# Patient Record
Sex: MALE | URBAN - METROPOLITAN AREA
[De-identification: names, ages, dates, MRNs, and addresses within clinical notes are randomized per-mention and may not be internally consistent; named-entity substitution may affect disease eponyms.]

---

## 2019-03-07 ENCOUNTER — OFFICE VISIT (OUTPATIENT)
Dept: URBAN - METROPOLITAN AREA CLINIC 33 | Facility: CLINIC | Age: 34
End: 2019-03-07
Payer: COMMERCIAL

## 2019-03-07 DIAGNOSIS — H04.123 DRY EYE SYNDROME OF BILATERAL LACRIMAL GLANDS: ICD-10-CM

## 2019-03-07 DIAGNOSIS — E11.9 TYPE 2 DIABETES MELLITUS W/O COMPLICATION: Primary | ICD-10-CM

## 2019-03-07 PROCEDURE — 99204 OFFICE O/P NEW MOD 45 MIN: CPT | Performed by: OPTOMETRIST

## 2019-03-07 ASSESSMENT — INTRAOCULAR PRESSURE
OD: 20
OS: 21

## 2019-03-07 ASSESSMENT — KERATOMETRY
OD: 45.88
OS: 47.00

## 2019-03-07 NOTE — IMPRESSION/PLAN
Impression: Type 2 diabetes mellitus w/o complication: K83.6. Plan: Diabetes w/o Complications: No signs of diabetic retinopathy noted. No treatment necessary at this time. Patient was instructed to monitor vision for sudden changes and to call if visual changes noted. Discussed ocular and systemic benefits of blood sugar control. Continue management with PCP. Letter sent to PCP regarding status of ocular health. If desired, return for glasses prescription.

## 2025-03-08 ENCOUNTER — HOSPITAL ENCOUNTER (EMERGENCY)
Facility: HOSPITAL | Age: 40
Discharge: HOME | End: 2025-03-08
Attending: EMERGENCY MEDICINE

## 2025-03-08 VITALS
SYSTOLIC BLOOD PRESSURE: 160 MMHG | OXYGEN SATURATION: 97 % | HEART RATE: 68 BPM | DIASTOLIC BLOOD PRESSURE: 74 MMHG | RESPIRATION RATE: 10 BRPM

## 2025-03-08 DIAGNOSIS — Z76.0 ENCOUNTER FOR MEDICATION REFILL: ICD-10-CM

## 2025-03-08 DIAGNOSIS — R20.2 PARESTHESIAS: Primary | ICD-10-CM

## 2025-03-08 DIAGNOSIS — R03.0 ELEVATED BLOOD PRESSURE READING: ICD-10-CM

## 2025-03-08 DIAGNOSIS — R29.818 ACUTE FOCAL NEUROLOGICAL DEFICIT: ICD-10-CM

## 2025-03-08 LAB
ALBUMIN SERPL BCP-MCNC: 4.1 G/DL (ref 3.5–5.2)
ALP SERPL-CCNC: 76 U/L (ref 40–150)
ALT SERPL W/O P-5'-P-CCNC: 16 U/L (ref 10–44)
ANION GAP SERPL CALC-SCNC: 10 MMOL/L (ref 8–16)
AST SERPL-CCNC: 19 U/L (ref 10–40)
BASOPHILS # BLD AUTO: 0.08 K/UL (ref 0–0.2)
BASOPHILS NFR BLD: 0.7 % (ref 0–1.9)
BILIRUB SERPL-MCNC: 0.6 MG/DL (ref 0.1–1)
BNP SERPL-MCNC: 130 PG/ML (ref 0–99)
BUN SERPL-MCNC: 18 MG/DL (ref 6–20)
CALCIUM SERPL-MCNC: 10 MG/DL (ref 8.7–10.5)
CHLORIDE SERPL-SCNC: 104 MMOL/L (ref 95–110)
CHOLEST SERPL-MCNC: 168 MG/DL (ref 120–199)
CHOLEST/HDLC SERPL: 3.9 {RATIO} (ref 2–5)
CO2 SERPL-SCNC: 21 MMOL/L (ref 23–29)
CREAT SERPL-MCNC: 1.2 MG/DL (ref 0.5–1.4)
CREAT SERPL-MCNC: 1.3 MG/DL (ref 0.5–1.4)
DIFFERENTIAL METHOD BLD: ABNORMAL
EOSINOPHIL # BLD AUTO: 1 K/UL (ref 0–0.5)
EOSINOPHIL NFR BLD: 8.7 % (ref 0–8)
ERYTHROCYTE [DISTWIDTH] IN BLOOD BY AUTOMATED COUNT: 12.1 % (ref 11.5–14.5)
EST. GFR  (NO RACE VARIABLE): >60 ML/MIN/1.73 M^2
GLUCOSE SERPL-MCNC: 124 MG/DL (ref 70–110)
HCT VFR BLD AUTO: 47.2 % (ref 40–54)
HDLC SERPL-MCNC: 43 MG/DL (ref 40–75)
HDLC SERPL: 25.6 % (ref 20–50)
HGB BLD-MCNC: 16.1 G/DL (ref 14–18)
IMM GRANULOCYTES # BLD AUTO: 0.05 K/UL (ref 0–0.04)
IMM GRANULOCYTES NFR BLD AUTO: 0.5 % (ref 0–0.5)
INR PPP: 0.9 (ref 0.8–1.2)
LDLC SERPL CALC-MCNC: 109.8 MG/DL (ref 63–159)
LYMPHOCYTES # BLD AUTO: 2.9 K/UL (ref 1–4.8)
LYMPHOCYTES NFR BLD: 25.8 % (ref 18–48)
MCH RBC QN AUTO: 29.6 PG (ref 27–31)
MCHC RBC AUTO-ENTMCNC: 34.1 G/DL (ref 32–36)
MCV RBC AUTO: 87 FL (ref 82–98)
MONOCYTES # BLD AUTO: 0.7 K/UL (ref 0.3–1)
MONOCYTES NFR BLD: 5.9 % (ref 4–15)
NEUTROPHILS # BLD AUTO: 6.5 K/UL (ref 1.8–7.7)
NEUTROPHILS NFR BLD: 58.4 % (ref 38–73)
NONHDLC SERPL-MCNC: 125 MG/DL
NRBC BLD-RTO: 0 /100 WBC
PLATELET # BLD AUTO: 256 K/UL (ref 150–450)
PLATELET BLD QL SMEAR: ABNORMAL
PMV BLD AUTO: 10.7 FL (ref 9.2–12.9)
POCT GLUCOSE: 121 MG/DL (ref 70–110)
POTASSIUM SERPL-SCNC: 4.6 MMOL/L (ref 3.5–5.1)
PROT SERPL-MCNC: 8 G/DL (ref 6–8.4)
PROTHROMBIN TIME: 10.5 SEC (ref 9–12.5)
RBC # BLD AUTO: 5.44 M/UL (ref 4.6–6.2)
SAMPLE: NORMAL
SODIUM SERPL-SCNC: 135 MMOL/L (ref 136–145)
TRIGL SERPL-MCNC: 76 MG/DL (ref 30–150)
TROPONIN I SERPL DL<=0.01 NG/ML-MCNC: 0.01 NG/ML (ref 0–0.03)
TSH SERPL DL<=0.005 MIU/L-ACNC: 0.72 UIU/ML (ref 0.4–4)
WBC # BLD AUTO: 11.08 K/UL (ref 3.9–12.7)

## 2025-03-08 PROCEDURE — 84443 ASSAY THYROID STIM HORMONE: CPT | Performed by: EMERGENCY MEDICINE

## 2025-03-08 PROCEDURE — G0426 INPT/ED TELECONSULT50: HCPCS | Mod: GT,,, | Performed by: STUDENT IN AN ORGANIZED HEALTH CARE EDUCATION/TRAINING PROGRAM

## 2025-03-08 PROCEDURE — 80053 COMPREHEN METABOLIC PANEL: CPT | Performed by: EMERGENCY MEDICINE

## 2025-03-08 PROCEDURE — 85610 PROTHROMBIN TIME: CPT

## 2025-03-08 PROCEDURE — 85610 PROTHROMBIN TIME: CPT | Performed by: EMERGENCY MEDICINE

## 2025-03-08 PROCEDURE — 85025 COMPLETE CBC W/AUTO DIFF WBC: CPT | Performed by: EMERGENCY MEDICINE

## 2025-03-08 PROCEDURE — 25500020 PHARM REV CODE 255: Performed by: EMERGENCY MEDICINE

## 2025-03-08 PROCEDURE — 99900035 HC TECH TIME PER 15 MIN (STAT)

## 2025-03-08 PROCEDURE — 82803 BLOOD GASES ANY COMBINATION: CPT

## 2025-03-08 PROCEDURE — 80061 LIPID PANEL: CPT | Performed by: EMERGENCY MEDICINE

## 2025-03-08 PROCEDURE — 83880 ASSAY OF NATRIURETIC PEPTIDE: CPT | Performed by: EMERGENCY MEDICINE

## 2025-03-08 PROCEDURE — 93005 ELECTROCARDIOGRAM TRACING: CPT

## 2025-03-08 PROCEDURE — 84484 ASSAY OF TROPONIN QUANT: CPT | Performed by: EMERGENCY MEDICINE

## 2025-03-08 PROCEDURE — 82565 ASSAY OF CREATININE: CPT

## 2025-03-08 PROCEDURE — 93010 ELECTROCARDIOGRAM REPORT: CPT | Mod: ,,, | Performed by: STUDENT IN AN ORGANIZED HEALTH CARE EDUCATION/TRAINING PROGRAM

## 2025-03-08 PROCEDURE — 82962 GLUCOSE BLOOD TEST: CPT

## 2025-03-08 PROCEDURE — 99285 EMERGENCY DEPT VISIT HI MDM: CPT | Mod: 25

## 2025-03-08 RX ORDER — HYDRALAZINE HYDROCHLORIDE 25 MG/1
25 TABLET, FILM COATED ORAL EVERY 8 HOURS
COMMUNITY

## 2025-03-08 RX ORDER — DILTIAZEM HYDROCHLORIDE 120 MG/1
120 CAPSULE, EXTENDED RELEASE ORAL DAILY
COMMUNITY
End: 2025-03-08

## 2025-03-08 RX ORDER — IRBESARTAN 150 MG/1
150 TABLET ORAL DAILY
Qty: 90 TABLET | Refills: 0 | Status: SHIPPED | OUTPATIENT
Start: 2025-03-08

## 2025-03-08 RX ORDER — IRBESARTAN 150 MG/1
150 TABLET ORAL DAILY
COMMUNITY
End: 2025-03-08

## 2025-03-08 RX ORDER — CARVEDILOL 12.5 MG/1
12.5 TABLET ORAL 2 TIMES DAILY WITH MEALS
COMMUNITY

## 2025-03-08 RX ORDER — GABAPENTIN 100 MG/1
100 CAPSULE ORAL 3 TIMES DAILY PRN
Qty: 30 CAPSULE | Refills: 0 | Status: SHIPPED | OUTPATIENT
Start: 2025-03-08 | End: 2026-03-08

## 2025-03-08 RX ORDER — DILTIAZEM HYDROCHLORIDE 120 MG/1
120 CAPSULE, EXTENDED RELEASE ORAL DAILY
Qty: 30 CAPSULE | Refills: 0 | Status: SHIPPED | OUTPATIENT
Start: 2025-03-08

## 2025-03-08 RX ADMIN — IOHEXOL 100 ML: 350 INJECTION, SOLUTION INTRAVENOUS at 04:03

## 2025-03-08 NOTE — ED PROVIDER NOTES
Encounter Date: 3/8/2025       History     Chief Complaint   Patient presents with    Numbness     LUE numbness started yesterday at 1700. LLE numbness x3 days. /109. -facial droop, -weakness, -slurred speech, -VAN.      Pt is a 40 yo M with a pmhx of HNT who presents to the ED with the complaint of LUE and LLE paresthesias that began approximately 23 hours ago. 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 He also reports his blood pressure being elevated 2/2 to being out of his ARB. He denies any facial droop, aphasia, slurred speech, overt numbness and/or weakness of any extremity.     Per triage note, a stroke code was initiate (prior to my evaluation) 2/2 to EPIC check box triggering.       Review of patient's allergies indicates:  No Known Allergies  History reviewed. No pertinent past medical history.  History reviewed. No pertinent surgical history.  No family history on file.  Social History[1]  Review of Systems   Constitutional:  Negative for chills, fatigue and fever.   Respiratory:  Negative for cough and chest tightness.    Cardiovascular:  Negative for chest pain and leg swelling.   Gastrointestinal:  Negative for nausea and vomiting.   Musculoskeletal:  Negative for back pain, neck pain and neck stiffness.   Neurological:  Negative for dizziness, tremors, seizures, syncope, weakness, numbness and headaches.   Psychiatric/Behavioral:  Negative for agitation and confusion.        Physical Exam     Initial Vitals [03/08/25 1551]   BP Pulse Resp Temp SpO2   (!) 222/109 98 16 -- 98 %      MAP       --         Physical Exam    Nursing note and vitals reviewed.  Constitutional: He appears well-developed and well-nourished.   HENT:   Head: Normocephalic and atraumatic.   Right Ear: External ear normal.   Left Ear: External ear normal.   Eyes: EOM are normal. Pupils are equal, round, and reactive  to light.   Neck: Neck supple.   Normal range of motion.  Cardiovascular:  Normal rate and regular rhythm.           Pulmonary/Chest: Breath sounds normal.   Abdominal: Abdomen is soft. Bowel sounds are normal.   Musculoskeletal:      Cervical back: Normal range of motion and neck supple.     Neurological: He is alert and oriented to person, place, and time. He has normal strength.   Strength 5/5 in all four extremities  Sensation grossly in tact  MAEW  GCS 15  AAOX3  Gait Wnl   No aphasia  No facial droop  No dysarthria    Skin: Skin is warm. Capillary refill takes less than 2 seconds.   Psychiatric: He has a normal mood and affect.         ED Course   Procedures  Labs Reviewed   CBC W/ AUTO DIFFERENTIAL - Abnormal       Result Value    WBC 11.08      RBC 5.44      Hemoglobin 16.1      Hematocrit 47.2      MCV 87      MCH 29.6      MCHC 34.1      RDW 12.1      Platelets 256      MPV 10.7      Immature Granulocytes 0.5      Gran # (ANC) 6.5      Immature Grans (Abs) 0.05 (*)     Lymph # 2.9      Mono # 0.7      Eos # 1.0 (*)     Baso # 0.08      nRBC 0      Gran % 58.4      Lymph % 25.8      Mono % 5.9      Eosinophil % 8.7 (*)     Basophil % 0.7      Platelet Estimate Appears normal      Differential Method Automated     COMPREHENSIVE METABOLIC PANEL - Abnormal    Sodium 135 (*)     Potassium 4.6      Chloride 104      CO2 21 (*)     Glucose 124 (*)     BUN 18      Creatinine 1.2      Calcium 10.0      Total Protein 8.0      Albumin 4.1      Total Bilirubin 0.6      Alkaline Phosphatase 76      AST 19      ALT 16      eGFR >60      Anion Gap 10     B-TYPE NATRIURETIC PEPTIDE - Abnormal     (*)    POCT GLUCOSE - Abnormal    POCT Glucose 121 (*)    PROTIME-INR    Prothrombin Time 10.5      INR 0.9     TSH    TSH 0.716     LIPID PANEL    Cholesterol 168      Triglycerides 76      HDL 43      LDL Cholesterol 109.8      HDL/Cholesterol Ratio 25.6      Total Cholesterol/HDL Ratio 3.9      Non-HDL Cholesterol 125      B-TYPE NATRIURETIC PEPTIDE   TROPONIN I   TROPONIN I    Troponin I 0.011     POCT GLUCOSE, HAND-HELD DEVICE   ISTAT CREATININE    POC Creatinine 1.3      Sample VENOUS       EKG Readings: (Independently Interpreted)   SR; RBBB; no significant        Imaging Results              CTA Head and Neck (xpd) (Final result)  Result time 03/08/25 17:04:05      Final result by Jerry Centeno MD (03/08/25 17:04:05)                   Impression:      No acute intracranial process.    Partially empty sella, transverse sinus stenosis and question fullness of the optic nerve sheaths can be seen within normal variation however they can also be seen with idiopathic intracranial hypertension if suspected clinically.    Prominent sinus and nasal cavity fluid/mucosal thickening.    No significant stenosis at the carotid bifurcations by NASCET criteria.  The vertebral arteries are patent without advanced stenosis.    No major branch advanced stenosis/occlusion at the [id number]-Yfgtbf.      Electronically signed by: Jerry Centeno  Date:    03/08/2025  Time:    17:04               Narrative:    EXAMINATION:  CTA HEAD AND NECK (XPD)    CLINICAL HISTORY:  Neuro deficit, acute, stroke suspected;    TECHNIQUE:  Non contrast low dose axial images were obtained through the head.  CT angiogram was performed from the level of the christy to the top of the head following the IV administration of 100mL of Omnipaque 350.   Sagittal and coronal reconstructions and maximum intensity projection reconstructions were performed. Arterial stenosis percentages are based on NASCET measurement criteria.    3D reformats were created on an independent workstation to evaluate the ufvcln-jv-Oilomf.    COMPARISON:  None    FINDINGS:  Brain:    There is no evidence of hydrocephalus, mass effect, intracranial hemorrhage, or acute territorial infarct.    The brain parenchyma maintains normal attenuation.    Partially empty sella.  Question fullness of the  optic nerve sheaths.    No enhancing intracranial lesion.    Opacification of the maxillary sinuses and mid to inferior nasal cavity.    CTA:    No advanced stenosis at the origins of the vessels from the aortic arch.    No advanced stenosis of the vertebral arteries in the neck.    No significant stenosis at the carotid bifurcations by NASCET criteria.    Intracranially there is no major branch advanced stenosis/occlusion.    No aneurysm. The venous sinuses are patent although there appears to be transverse sinus stenosis bilaterally.    No soft tissue mass in the neck.    These findings were communicated to Dr. Pina at 4 50 20:00 on 03/08/2025.                                       Medications   iohexoL (OMNIPAQUE 350) injection 100 mL (100 mLs Intravenous Given 3/8/25 1607)     Medical Decision Making  Amount and/or Complexity of Data Reviewed  Labs: ordered. Decision-making details documented in ED Course.  Radiology: ordered.    Risk  Prescription drug management.               ED Course as of 03/08/25 1912   Sat Mar 08, 2025   1708 BNP(!): 130 [LC]   1709 No acute intracranial process.     Partially empty sella, transverse sinus stenosis and question fullness of the optic nerve sheaths can be seen within normal variation however they can also be seen with idiopathic intracranial hypertension if suspected clinically.     Prominent sinus and nasal cavity fluid/mucosal thickening.     No significant stenosis at the carotid bifurcations by NASCET criteria.  The vertebral arteries are patent without advanced stenosis.     No major branch advanced stenosis/occlusion at the [id number]-rt-Wthsfb.   [LC]   1709 Sodium(!): 135 [LC]   1709 Potassium: 4.6 [LC]   1709 Chloride: 104 [LC]   1709 CO2(!): 21 [LC]   1709 Cholesterol Total: 168 [LC]   1709 Triglycerides: 76 [LC]   1709 HDL: 43 [LC]   1709 Total Cholesterol/HDL Ratio: 3.9 [LC]   1709 Non-HDL Cholesterol: 125 [LC]   1709 WBC: 11.08 [LC]   1709 Sample: VENOUS  [LC]   1709 BNP(!): 130 [LC]   1808 BNP(!): 130 [LC]   1808 POC Creatinine: 1.3 [LC]      ED Course User Index  [LC] Kwesi Pina MD               Medical Decision Making:   Initial Assessment:   See HPI     Clinical Tests:   Lab Tests: Reviewed and Ordered  Radiological Study: Ordered and Reviewed  ED Management:  - PATIENT INITIALLY CODE STROKE FOR REPORTED LEFT UPPER AND LEFT LOWER EXTREMITY WEAKNESS THAT BEGAN WITHIN 24 HOURS; THIS WAS INITIATED BY THE TRIAGE NURSE.  A CT OF THE HEAD AND NECK WAS OBTAINED WHICH DEMONSTRATED NO FINDINGS OF A LARGE VESSEL OCCLUSION; THE PATIENT WAS EVALUATED BY THE VASCULAR NEUROLOGIST WHO RECOMMENDED SYMPTOMATIC TREATMENT FOR HYPERTENSION AND PROBABLE PARESTHESIAS AS THE PATIENT WAS COMPLETELY ASYMPTOMATIC ON VASCULAR NEUROLOGIST EVALUATION  - ROUTINE LABORATORY ANALYSIS WITHOUT FINDINGS OF SO-CALLED END-ORGAN DAMAGE; THE PATIENT HAS A MILD ELEVATION IS BNP BUT HIS CHEST X-RAY DEMONSTRATES NO FINDINGS OF OVERT CARDIOMEGALY, PLEURAL EFFUSION OR OTHER ACUTE ABNORMALITY  - PATIENT'S ECG REASSURING WITHOUT ACUTE ABNORMALITIES; THE PATIENT HAS BEEN RUNNING LOW IN HIS PRESCRIPTIONS OF DILTIAZEM AND ARB SOME WILL REFILL THESE MEDICATIONS AS THE PATIENT IS CRITICALLY LOW IN HIS FROM OUT OF TOWN.  - No further intervention is indicated at this time after having taken into account the patient's history, physical exam findings, and empirical and objective data obtained during the patient's emergency department workup.   - The patient is at low risk for an emergent medical condition at this time, and I am of the belief that that it is safe to discharge the patient from the emergency department.   - The patient is instructed to follow up as outpatient as indicated on the discharge paperwork.    - I have discussed the specifics of the workup with the patient and the patient has verbalized understanding of the details of the workup, the diagnosis, the treatment plan, and the need for  outpatient follow-up.    - Although the patient has no emergent etiology today this does not preclude the development of an emergent condition so, in addition, I have advised the patient that they can return to the ED and/or activate EMS at any time with worsening of their symptoms, change of their symptoms, or with any other medical complaint.    - The patient remained comfortable and stable during their visit in the ED.    - Discharge and follow-up instructions discussed with the patient who expressed understanding and willingness to comply with my recommendations.  - Results of all emergency department tests  discussed thoroughly with patient; all patient questions answered; pt in agreement with plan  - Pt instructed to follow up with PCP in 2-3 days for recheck of today's complaints  - Pt given strict emergency department return precautions for any new or worsening of symptoms  - Pt discharged from the emergency department in stable condition, in no acute distress     Other:   I have discussed this case with another health care provider.       <> Summary of the Discussion: - CASE DISCUSSED WITH VASCULAR NEUROLOGY WHO DID NOT RECOMMEND TNK, MRI AS PT'S SYMPTOMS RESOLVED ON VASCULAR NEUROLOGY EVALUATION.      Thrombolysis Candidate? Pt is not a candidate for thrombolysis Delays to Thrombolysis?  No        Clinical Impression:  Final diagnoses:  [R29.818] Acute focal neurological deficit  [R20.2] Paresthesias (Primary)  [R03.0] Elevated blood pressure reading  [Z76.0] Encounter for medication refill          ED Disposition Condition    Discharge Stable          ED Prescriptions       Medication Sig Dispense Start Date End Date Auth. Provider    irbesartan (AVAPRO) 150 MG tablet Take 1 tablet (150 mg total) by mouth once daily. 90 tablet 3/8/2025 -- Kwesi Pina MD    diltiaZEM HCl (TIAZAC) 120 mg 24 hr capsule Take 1 capsule (120 mg total) by mouth once daily. 30 capsule 3/8/2025 -- Kwesi Pina MD     gabapentin (NEURONTIN) 100 MG capsule Take 1 capsule (100 mg total) by mouth 3 (three) times daily as needed (paresthesias). 30 capsule 3/8/2025 3/8/2026 Kwesi Pina MD          Follow-up Information    None              [1]   Social History  Tobacco Use    Smoking status: Former     Current packs/day: 0.00     Types: Cigarettes     Quit date: 3/8/2010     Years since quitting: 15.0    Smokeless tobacco: Never        Kwesi Pina MD  03/08/25 1917

## 2025-03-08 NOTE — ED NOTES
"Pt arrived to ED from home w/ C/O "headache and L arm numbness." Upon this RN assessment, HA and numbness has resolved. 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 Pt denies any current headache, numbness, tingling, weakness, CP, SOB, or vision issues. AOX4, denies pain, hypertensive w/ SBP >180.   "

## 2025-03-08 NOTE — TELEMEDICINE CONSULT
[facility]  Vascular Neurology  Comprehensive Stroke Center  TeleVascular Neurology Acute Consultation Note        Consult Information  Consults    Consulting Provider: JUAN LUIS PAGE   Current Providers  No providers found    Patient Location:  [facility] EMERGENCY DEPARTMENT Emergency Department    Spoke hospital nurse at bedside with patient assisting consultant.  Patient information was obtained from patient.       Stroke Documentation  Acute Stroke Times   Last Known Normal Date: 03/06/25  Last Known Normal Time:  (not known)  Symptom Onset Date: 03/06/25  Symptom Onset Time:  (not known)  Stroke Team Called Date: 03/08/25  Stroke Team Called Time: 1610  Stroke Team Arrival Date: 03/08/25  Stroke Team Arrival Time: 1610  CT Interpretation Time: 1610  Thrombolytic Therapy Recommended: No  CTA Interpretation Time: 1610  Thrombectomy Recommended: No    NIH Scale:         Modified David:    Rule Coma Scale:     ABCD2 Score:    [id number] Score:    HAS -BLED Score:    ICH Score:    Hunt & Beltran Classification:      Blood pressure (!) 158/77, pulse 69, resp. rate (!) 22, SpO2 99%.      In my opinion, this was a: Tier 1; VAN Stroke Assessment: Negative     Medical Decision Making  HPI:  39 y.o. 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 presented with 3 days of L thigh nagging sensation and 1 day of LUE numbnessand headache resolved by tylenol.  BP on arrival 222/109, . NIH 0, reported symptoms resolved. Reported missed BP meds today.      Images personally reviewed and interpreted:  Study: Head CT and CTA Head & Neck  Study Interpretation:   CTH : no hypodensity /hyper density   CTA : no LVO, no stenosis      Assessment and plan:  39 with hx of THN, presented with 3 days L thigh nagging sensation/pain, and LUE numbness +Headache x1 days ago. BP found to be 222/109.     Ddx hypertension emergency due to missed BP medications     --Slowly normalize BP to normotensive   --Can consider ASA  81 mg for cardiovascular protection   -- Consider MR brain WO if symptoms worsen   -- Continue home medications       #Stroke prevention recommendations   - Low-sodium DASH diet   - Outpatient goal for secondary stroke prevention: systolic <130  - Outpatient goal for secondary stroke prevention: LDL <70, HDL >40 for men & HDL >50 for women  - Recommended at least 30 minutes of cardio-aerobic exercise 3x/week         Lytics recommendation: Thrombolytic therapy not recommended due to Mild Non-Disabling Symptoms    Thrombectomy recommendation: No; No large vessel occlusion identified on imaging   Placement recommendation:  per primary team                ROS  Physical Exam  History reviewed. No pertinent past medical history.  History reviewed. No pertinent surgical history.  No family history on file.    Diagnoses  No problems updated.    Cesar Davila MD      Emergent/Acute neurological consultation requested by spoke provider due to critical concerns for possible cerebrovascular event that could result in permanent loss of neurologic/bodily function, severe disability or death of this patient.  Immediate/timely evaluation by a highly prepared expert is paramount for optimal outcomes  High risk for neurological deterioration if not properly diagnosed  High risk for neurological deterioration if not treated promplty/as soon as possible  Complex diagnostic evaluation may be required (advanced imaging)  High risk treatment options (thrombolytics and/or thrombectomy)    Patient care was coordinated with spoke provider, including but not limted to    Discussing likely diagnosis/etiology of symptoms  Making recommendations for further diagnostic studies  Making recommendations for intravenous thrombolytics or other advanced therapies  Making recommendations for disposition (admission/transfer for higher level of care)      Neurology consultation requested by spoke provider. Audiovisual encounter with the patient performed  using a secure connection.  Results and impressions from the visit are documented on this note and were communicated to the consulting provider/team via direct communication. The note has been shared for addition to the patients electronic medical record.

## 2025-03-08 NOTE — SUBJECTIVE & OBJECTIVE
HPI:  39 y.o. 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 presented with 3 days of L thigh nagging sensation and 1 day of LUE numbnessand headache resolved by tylenol.  BP on arrival 222/109, . NIH 0, reported symptoms resolved. Reported missed BP meds today.      Images personally reviewed and interpreted:  Study: Head CT and CTA Head & Neck  Study Interpretation:   CTH : no hypodensity /hyper density   CTA : no LVO, no stenosis      Assessment and plan:  39 with hx of THN, presented with 3 days L thigh nagging sensation/pain, and LUE numbness +Headache x1 days ago. BP found to be 222/109.     Ddx hypertension emergency due to missed BP medications     --Slowly normalize BP to normotensive   --Can consider ASA 81 mg for cardiovascular protection   -- Consider MR brain WO if symptoms worsen   -- Continue home medications       #Stroke prevention recommendations   - Low-sodium DASH diet   - Outpatient goal for secondary stroke prevention: systolic <130  - Outpatient goal for secondary stroke prevention: LDL <70, HDL >40 for men & HDL >50 for women  - Recommended at least 30 minutes of cardio-aerobic exercise 3x/week         Lytics recommendation: Thrombolytic therapy not recommended due to Mild Non-Disabling Symptoms    Thrombectomy recommendation: No; No large vessel occlusion identified on imaging   Placement recommendation:  per primary team

## 2025-03-09 NOTE — ED NOTES
Pt discharged home in care of self. Discharge instructions given. Medications and return to ED instructions discussed. Pt verbalized understanding. VSS.

## 2025-03-10 LAB
OHS QRS DURATION: 174 MS
OHS QTC CALCULATION: 469 MS